# Patient Record
Sex: FEMALE | Race: WHITE | NOT HISPANIC OR LATINO | URBAN - METROPOLITAN AREA
[De-identification: names, ages, dates, MRNs, and addresses within clinical notes are randomized per-mention and may not be internally consistent; named-entity substitution may affect disease eponyms.]

---

## 2017-01-12 ENCOUNTER — APPOINTMENT (RX ONLY)
Dept: URBAN - METROPOLITAN AREA CLINIC 170 | Facility: CLINIC | Age: 37
Setting detail: DERMATOLOGY
End: 2017-01-12

## 2017-01-12 DIAGNOSIS — L81.1 CHLOASMA: ICD-10-CM | Status: IMPROVED

## 2017-01-12 DIAGNOSIS — L81.4 OTHER MELANIN HYPERPIGMENTATION: ICD-10-CM

## 2017-01-12 PROCEDURE — ? MEDICAL CONSULTATION: BROWN SPOTS

## 2017-01-12 PROCEDURE — ? COUNSELING

## 2017-01-12 PROCEDURE — 99212 OFFICE O/P EST SF 10 MIN: CPT

## 2017-01-12 ASSESSMENT — LOCATION DETAILED DESCRIPTION DERM
LOCATION DETAILED: LEFT MEDIAL MALAR CHEEK
LOCATION DETAILED: LEFT CENTRAL MALAR CHEEK

## 2017-01-12 ASSESSMENT — LOCATION ZONE DERM: LOCATION ZONE: FACE

## 2017-01-12 ASSESSMENT — LOCATION SIMPLE DESCRIPTION DERM: LOCATION SIMPLE: LEFT CHEEK

## 2017-01-18 ENCOUNTER — APPOINTMENT (RX ONLY)
Dept: URBAN - METROPOLITAN AREA CLINIC 170 | Facility: CLINIC | Age: 37
Setting detail: DERMATOLOGY
End: 2017-01-18

## 2017-01-18 DIAGNOSIS — Z41.9 ENCOUNTER FOR PROCEDURE FOR PURPOSES OTHER THAN REMEDYING HEALTH STATE, UNSPECIFIED: ICD-10-CM

## 2017-01-18 PROCEDURE — ? PALOMAR ICON LASER

## 2017-01-18 ASSESSMENT — LOCATION SIMPLE DESCRIPTION DERM: LOCATION SIMPLE: LEFT CHEEK

## 2017-01-18 ASSESSMENT — LOCATION DETAILED DESCRIPTION DERM: LOCATION DETAILED: LEFT INFERIOR CENTRAL MALAR CHEEK

## 2017-01-18 ASSESSMENT — LOCATION ZONE DERM: LOCATION ZONE: FACE

## 2017-01-18 NOTE — PROCEDURE: PALOMAR ICON LASER
Fluence Units: J/cm2
Fluence: 32
Treatment Number: 1
Price (Use Numbers Only, No Special Characters Or $): 500
Overlap: 10%
Detail Level: Zone
Pulse Duration (In Milliseconds): 20
Post-Care Instructions: I reviewed with the patient in detail post-care instructions. Patient should stay away from the sun and wear sun protection until treated areas are fully healed.
Pre-Procedure Text: The patients skin was cleaned and Lux lotion applied.
Pulse Duration (In Milliseconds): 15
External Cooling: Alexandro Cryo 6
Consent: Written consent obtained, risks reviewed including but not limited to crusting, scabbing, blistering, scarring, darker or lighter pigmentary change, bruising, and/or incomplete response.
Fluence: 38
Anesthesia Type: 1% lidocaine with epinephrine
External Cooling Fan Speed: 5
Pulse Duration (In Milliseconds): 20
Treated Area: medium area
Total Pulses: 168
Fluence: 32
Anesthesia Volume In Cc: 0

## 2017-05-10 ENCOUNTER — APPOINTMENT (RX ONLY)
Dept: URBAN - METROPOLITAN AREA CLINIC 167 | Facility: CLINIC | Age: 37
Setting detail: DERMATOLOGY
End: 2017-05-10

## 2017-05-10 DIAGNOSIS — L0391 CELLULITIS AND ABSCESS OF UNSPECIFIED SITES: ICD-10-CM | Status: RESOLVING

## 2017-05-10 DIAGNOSIS — D22 MELANOCYTIC NEVI: ICD-10-CM

## 2017-05-10 DIAGNOSIS — L0390 CELLULITIS AND ABSCESS OF UNSPECIFIED SITES: ICD-10-CM | Status: RESOLVING

## 2017-05-10 DIAGNOSIS — L81.4 OTHER MELANIN HYPERPIGMENTATION: ICD-10-CM

## 2017-05-10 PROBLEM — D23.72 OTHER BENIGN NEOPLASM OF SKIN OF LEFT LOWER LIMB, INCLUDING HIP: Status: ACTIVE | Noted: 2017-05-10

## 2017-05-10 PROBLEM — D48.5 NEOPLASM OF UNCERTAIN BEHAVIOR OF SKIN: Status: ACTIVE | Noted: 2017-05-10

## 2017-05-10 PROBLEM — D22.4 MELANOCYTIC NEVI OF SCALP AND NECK: Status: ACTIVE | Noted: 2017-05-10

## 2017-05-10 PROBLEM — D22.62 MELANOCYTIC NEVI OF LEFT UPPER LIMB, INCLUDING SHOULDER: Status: ACTIVE | Noted: 2017-05-10

## 2017-05-10 PROBLEM — L02.91 CUTANEOUS ABSCESS, UNSPECIFIED: Status: ACTIVE | Noted: 2017-05-10

## 2017-05-10 PROBLEM — D23.71 OTHER BENIGN NEOPLASM OF SKIN OF RIGHT LOWER LIMB, INCLUDING HIP: Status: ACTIVE | Noted: 2017-05-10

## 2017-05-10 PROBLEM — D22.61 MELANOCYTIC NEVI OF RIGHT UPPER LIMB, INCLUDING SHOULDER: Status: ACTIVE | Noted: 2017-05-10

## 2017-05-10 PROBLEM — D22.5 MELANOCYTIC NEVI OF TRUNK: Status: ACTIVE | Noted: 2017-05-10

## 2017-05-10 PROCEDURE — 11100: CPT

## 2017-05-10 PROCEDURE — ? BIOPSY BY SHAVE METHOD

## 2017-05-10 PROCEDURE — 99213 OFFICE O/P EST LOW 20 MIN: CPT | Mod: 25

## 2017-05-10 PROCEDURE — ? COUNSELING

## 2017-05-10 PROCEDURE — ? TREATMENT REGIMEN

## 2017-05-10 ASSESSMENT — LOCATION SIMPLE DESCRIPTION DERM
LOCATION SIMPLE: POSTERIOR SCALP
LOCATION SIMPLE: LEFT POSTERIOR UPPER ARM
LOCATION SIMPLE: LEFT UPPER BACK
LOCATION SIMPLE: RIGHT SHOULDER
LOCATION SIMPLE: LEFT BUTTOCK
LOCATION SIMPLE: RIGHT UPPER BACK
LOCATION SIMPLE: LEFT SHOULDER
LOCATION SIMPLE: RIGHT POSTERIOR UPPER ARM

## 2017-05-10 ASSESSMENT — LOCATION ZONE DERM
LOCATION ZONE: TRUNK
LOCATION ZONE: ARM
LOCATION ZONE: SCALP

## 2017-05-10 ASSESSMENT — LOCATION DETAILED DESCRIPTION DERM
LOCATION DETAILED: LEFT POSTAURICULAR SKIN
LOCATION DETAILED: RIGHT POSTAURICULAR SKIN
LOCATION DETAILED: RIGHT PROXIMAL LATERAL POSTERIOR UPPER ARM
LOCATION DETAILED: RIGHT POSTERIOR SHOULDER
LOCATION DETAILED: LEFT POSTERIOR SHOULDER
LOCATION DETAILED: RIGHT MEDIAL UPPER BACK
LOCATION DETAILED: LEFT PROXIMAL POSTERIOR UPPER ARM
LOCATION DETAILED: LEFT MEDIAL BUTTOCK
LOCATION DETAILED: RIGHT SUPERIOR UPPER BACK
LOCATION DETAILED: LEFT SUPERIOR UPPER BACK

## 2017-05-10 NOTE — PROCEDURE: TREATMENT REGIMEN
Detail Level: Simple
Initiate Treatment: Hibiclens every day to wash and cover\\nBleach water baths three times weekly

## 2017-05-10 NOTE — PROCEDURE: BIOPSY BY SHAVE METHOD
Silver Nitrate Text: The wound bed was treated with silver nitrate after the biopsy was performed.
Lab: [id number]
Lab Facility: [id number]
Biopsy Method: curette
Bill 19494 For Specimen Handling/Conveyance To Laboratory?: no
Consent: Written consent was obtained and risks were reviewed including but not limited to scarring, infection, bleeding, scabbing, incomplete removal, nerve damage and allergy to anesthesia.
Detail Level: Detailed
Type Of Destruction Used: Curettage
Cryotherapy Text: The wound bed was treated with cryotherapy after the biopsy was performed.
Size Of Lesion In Cm: 0.6
Wound Care: Vaseline
Anesthesia Volume In Cc (Will Not Render If 0): 0.5
Hemostasis: Pressure
Notification Instructions: Patient will be notified of biopsy results. However, patient instructed to call the office if not contacted within 2 weeks.
Additional Anesthesia Volume In Cc (Will Not Render If 0): 0
Post-Care Instructions: I reviewed with the patient in detail post-care instructions. Patient is to keep the biopsy site dry overnight, and then apply bacitracin twice daily until healed. Patient may apply hydrogen peroxide soaks to remove any crusting.
Biopsy Type: H and E
Dressing: bandage
Electrodesiccation And Curettage Text: The wound bed was treated with electrodesiccation and curettage after the biopsy was performed.
Billing Type: Third-Party Bill
Electrodesiccation Text: The wound bed was treated with electrodesiccation after the biopsy was performed.

## 2017-12-19 ENCOUNTER — APPOINTMENT (RX ONLY)
Dept: URBAN - METROPOLITAN AREA CLINIC 167 | Facility: CLINIC | Age: 37
Setting detail: DERMATOLOGY
End: 2017-12-19

## 2017-12-19 DIAGNOSIS — L30.8 OTHER SPECIFIED DERMATITIS: ICD-10-CM

## 2017-12-19 PROCEDURE — ? COUNSELING

## 2017-12-19 PROCEDURE — ? TREATMENT REGIMEN

## 2017-12-19 PROCEDURE — ? OTHER

## 2017-12-19 PROCEDURE — 99213 OFFICE O/P EST LOW 20 MIN: CPT

## 2017-12-19 PROCEDURE — ? PRESCRIPTION

## 2017-12-19 RX ORDER — TRIAMCINOLONE ACETONIDE 1 MG/G
CREAM TOPICAL
Qty: 1 | Refills: 1 | Status: ERX | COMMUNITY
Start: 2017-12-19

## 2017-12-19 RX ADMIN — TRIAMCINOLONE ACETONIDE: 1 CREAM TOPICAL at 00:00

## 2017-12-19 ASSESSMENT — LOCATION SIMPLE DESCRIPTION DERM
LOCATION SIMPLE: LEFT UPPER BACK
LOCATION SIMPLE: RIGHT UPPER ARM
LOCATION SIMPLE: ABDOMEN

## 2017-12-19 ASSESSMENT — LOCATION DETAILED DESCRIPTION DERM
LOCATION DETAILED: PERIUMBILICAL SKIN
LOCATION DETAILED: RIGHT ANTERIOR DISTAL UPPER ARM
LOCATION DETAILED: LEFT INFERIOR MEDIAL UPPER BACK

## 2017-12-19 ASSESSMENT — LOCATION ZONE DERM
LOCATION ZONE: ARM
LOCATION ZONE: TRUNK

## 2017-12-19 NOTE — PROCEDURE: OTHER
Detail Level: Detailed
Other (Free Text): Rash on neck, abd , arm for 2 days, not itching.\\nNo treatment\\nNo change in medicines\\nNo changes in outdoor activities or animal encounters\\nNo recent illnesses\\nUsually does not get a big reaction to mosquitoes\\nSon has been sick...had fifth disease\\n\\nThey are red round to oval non-scaly plaques with white flare\\nDon't exactly look like bug bites\\nCan't determine the cause at this time\\nWill start with TAC cr\\nIf needed, can increase to a stronger steroid if needed
Note Text (......Xxx Chief Complaint.): This diagnosis correlates with the

## 2017-12-19 NOTE — PROCEDURE: TREATMENT REGIMEN
Plan: Recognize and avoid triggers, outside environment, soaps, medications ,animals
Initiate Treatment: Triamcinolone 0.1% cream Apply to affected areas twice daily for up to two weeks as needed for the mild to moderate rash
Continue Regimen: Antihistamine
Detail Level: Zone

## 2019-08-29 ENCOUNTER — APPOINTMENT (RX ONLY)
Dept: URBAN - METROPOLITAN AREA CLINIC 166 | Facility: CLINIC | Age: 39
Setting detail: DERMATOLOGY
End: 2019-08-29

## 2019-08-29 DIAGNOSIS — D17 BENIGN LIPOMATOUS NEOPLASM: ICD-10-CM

## 2019-08-29 DIAGNOSIS — L50.3 DERMATOGRAPHIC URTICARIA: ICD-10-CM

## 2019-08-29 PROBLEM — D17.1 BENIGN LIPOMATOUS NEOPLASM OF SKIN AND SUBCUTANEOUS TISSUE OF TRUNK: Status: ACTIVE | Noted: 2019-08-29

## 2019-08-29 PROCEDURE — ? COUNSELING

## 2019-08-29 PROCEDURE — 99213 OFFICE O/P EST LOW 20 MIN: CPT

## 2019-08-29 ASSESSMENT — LOCATION ZONE DERM: LOCATION ZONE: TRUNK

## 2019-08-29 ASSESSMENT — LOCATION SIMPLE DESCRIPTION DERM: LOCATION SIMPLE: LEFT UPPER BACK

## 2019-08-29 ASSESSMENT — LOCATION DETAILED DESCRIPTION DERM: LOCATION DETAILED: LEFT INFERIOR UPPER BACK

## 2022-10-31 ENCOUNTER — APPOINTMENT (RX ONLY)
Dept: URBAN - METROPOLITAN AREA CLINIC 170 | Facility: CLINIC | Age: 42
Setting detail: DERMATOLOGY
End: 2022-10-31

## 2022-10-31 DIAGNOSIS — L82.0 INFLAMED SEBORRHEIC KERATOSIS: ICD-10-CM

## 2022-10-31 DIAGNOSIS — D18.0 HEMANGIOMA: ICD-10-CM

## 2022-10-31 DIAGNOSIS — L81.4 OTHER MELANIN HYPERPIGMENTATION: ICD-10-CM

## 2022-10-31 DIAGNOSIS — L82.1 OTHER SEBORRHEIC KERATOSIS: ICD-10-CM

## 2022-10-31 DIAGNOSIS — Z71.89 OTHER SPECIFIED COUNSELING: ICD-10-CM

## 2022-10-31 DIAGNOSIS — D17 BENIGN LIPOMATOUS NEOPLASM: ICD-10-CM

## 2022-10-31 DIAGNOSIS — D22 MELANOCYTIC NEVI: ICD-10-CM

## 2022-10-31 PROBLEM — D22.71 MELANOCYTIC NEVI OF RIGHT LOWER LIMB, INCLUDING HIP: Status: ACTIVE | Noted: 2022-10-31

## 2022-10-31 PROBLEM — D22.72 MELANOCYTIC NEVI OF LEFT LOWER LIMB, INCLUDING HIP: Status: ACTIVE | Noted: 2022-10-31

## 2022-10-31 PROBLEM — D22.62 MELANOCYTIC NEVI OF LEFT UPPER LIMB, INCLUDING SHOULDER: Status: ACTIVE | Noted: 2022-10-31

## 2022-10-31 PROBLEM — D22.5 MELANOCYTIC NEVI OF TRUNK: Status: ACTIVE | Noted: 2022-10-31

## 2022-10-31 PROBLEM — D18.01 HEMANGIOMA OF SKIN AND SUBCUTANEOUS TISSUE: Status: ACTIVE | Noted: 2022-10-31

## 2022-10-31 PROBLEM — D17.1 BENIGN LIPOMATOUS NEOPLASM OF SKIN AND SUBCUTANEOUS TISSUE OF TRUNK: Status: ACTIVE | Noted: 2022-10-31

## 2022-10-31 PROBLEM — D22.61 MELANOCYTIC NEVI OF RIGHT UPPER LIMB, INCLUDING SHOULDER: Status: ACTIVE | Noted: 2022-10-31

## 2022-10-31 PROCEDURE — ? COUNSELING

## 2022-10-31 PROCEDURE — 17110 DESTRUCTION B9 LES UP TO 14: CPT

## 2022-10-31 PROCEDURE — ? LIQUID NITROGEN

## 2022-10-31 PROCEDURE — 99202 OFFICE O/P NEW SF 15 MIN: CPT | Mod: 25

## 2022-10-31 ASSESSMENT — LOCATION DETAILED DESCRIPTION DERM
LOCATION DETAILED: RIGHT LATERAL ABDOMEN
LOCATION DETAILED: RIGHT DISTAL CALF
LOCATION DETAILED: LEFT INFERIOR UPPER BACK
LOCATION DETAILED: MID TRAPEZIAL NECK
LOCATION DETAILED: LEFT POSTERIOR SHOULDER
LOCATION DETAILED: LEFT DISTAL POSTERIOR UPPER ARM
LOCATION DETAILED: INFERIOR THORACIC SPINE
LOCATION DETAILED: LEFT SUPERIOR LATERAL UPPER BACK
LOCATION DETAILED: RIGHT DISTAL POSTERIOR UPPER ARM
LOCATION DETAILED: LEFT DISTAL CALF
LOCATION DETAILED: RIGHT POSTERIOR SHOULDER

## 2022-10-31 ASSESSMENT — LOCATION SIMPLE DESCRIPTION DERM
LOCATION SIMPLE: RIGHT SHOULDER
LOCATION SIMPLE: LEFT SHOULDER
LOCATION SIMPLE: LEFT CALF
LOCATION SIMPLE: LEFT POSTERIOR UPPER ARM
LOCATION SIMPLE: TRAPEZIAL NECK
LOCATION SIMPLE: RIGHT CALF
LOCATION SIMPLE: ABDOMEN
LOCATION SIMPLE: RIGHT POSTERIOR UPPER ARM
LOCATION SIMPLE: UPPER BACK
LOCATION SIMPLE: LEFT UPPER BACK

## 2022-10-31 ASSESSMENT — LOCATION ZONE DERM
LOCATION ZONE: LEG
LOCATION ZONE: ARM
LOCATION ZONE: NECK
LOCATION ZONE: TRUNK

## 2022-10-31 NOTE — PROCEDURE: LIQUID NITROGEN
Show Aperture Variable?: Yes
Detail Level: Simple
Post-Care Instructions: I reviewed with the patient in detail post-care instructions. Patient is to wear sunprotection, and avoid picking at any of the treated lesions. Pt may apply Vaseline to crusted or scabbing areas.
Medical Necessity Information: It is in your best interest to select a reason for this procedure from the list below. All of these items fulfill various CMS LCD requirements except the new and changing color options.
Consent: The patient's consent was obtained including but not limited to risks of crusting, scabbing, blistering, scarring, darker or lighter pigmentary change, recurrence, incomplete removal and infection.
Spray Paint Technique: No
Medical Necessity Clause: This procedure was medically necessary because the lesions that were treated were:irritated by picking
Number Of Freeze-Thaw Cycles: 1 freeze-thaw cycle
Spray Paint Text: The liquid nitrogen was applied to the skin utilizing a spray paint frosting technique.

## 2023-03-09 ENCOUNTER — APPOINTMENT (RX ONLY)
Dept: URBAN - METROPOLITAN AREA CLINIC 173 | Facility: CLINIC | Age: 43
Setting detail: DERMATOLOGY
End: 2023-03-09

## 2023-03-09 DIAGNOSIS — Z41.9 ENCOUNTER FOR PROCEDURE FOR PURPOSES OTHER THAN REMEDYING HEALTH STATE, UNSPECIFIED: ICD-10-CM

## 2023-03-09 PROCEDURE — ? COSMETIC CONSULTATION: BOTULINUM TOXIN

## 2023-03-09 PROCEDURE — ? DYSPORT

## 2023-03-09 PROCEDURE — ? COSMETIC CONSULTATION - MICRO-NEEDLING

## 2023-03-09 PROCEDURE — ? TREATMENT REGIMEN

## 2023-03-09 PROCEDURE — ? COSMETIC CONSULTATION: LASER RESURFACING

## 2023-03-09 PROCEDURE — ? PATIENT SPECIFIC COUNSELING

## 2023-03-09 PROCEDURE — ? COSMETIC CONSULTATION: CLEAR AND BRILLIANT

## 2023-03-09 NOTE — PROCEDURE: DYSPORT
Lot #: [id number]
Additional Area 2 Units: 0
Show Forehead Units: Yes
Dilution (U/ 0.1cc): 10
Show Right And Left Pupillary Line Units: No
Expiration Date (Month Year): 7/31/23
Detail Level: Zone
Price (Use Numbers Only, No Special Characters Or $): 486
Additional Area 1 Location: Face
Consent: Written consent obtained. Risks include but not limited to lid/brow ptosis, bruising, swelling, diplopia, temporary effect, incomplete chemical denervation.
Additional Area 1 Units: 132
Post-Care Instructions: Patient instructed to not lie down for 4 hours and limit physical activity for 24 hours.

## 2023-03-09 NOTE — PROCEDURE: TREATMENT REGIMEN
Plan: AM: Alto, AlphaRet, Ceramides, CeraVe PM, Elta spf\\nPM: CeraVe PM
Samples Given: Alto intensive, AlphaRet Overnight
Continue Regimen: Elta MD spf, Belinda carmonas
Detail Level: Zone

## 2023-03-09 NOTE — PROCEDURE: PATIENT SPECIFIC COUNSELING
Recommended 6 sessions WiQo $250 per tx. Patient wishes to start with this treatment
Detail Level: Zone

## 2023-03-23 ENCOUNTER — APPOINTMENT (RX ONLY)
Dept: URBAN - METROPOLITAN AREA CLINIC 173 | Facility: CLINIC | Age: 43
Setting detail: DERMATOLOGY
End: 2023-03-23

## 2023-03-23 DIAGNOSIS — Z41.9 ENCOUNTER FOR PROCEDURE FOR PURPOSES OTHER THAN REMEDYING HEALTH STATE, UNSPECIFIED: ICD-10-CM

## 2023-03-23 DIAGNOSIS — L91.8 OTHER HYPERTROPHIC DISORDERS OF THE SKIN: ICD-10-CM

## 2023-03-23 PROCEDURE — ? DIAGNOSIS COMMENT

## 2023-03-23 PROCEDURE — 11200 RMVL SKIN TAGS UP TO&INC 15: CPT

## 2023-03-23 PROCEDURE — ? WIQO PEEL

## 2023-03-23 PROCEDURE — ? SKIN TAG REMOVAL

## 2023-03-23 PROCEDURE — ? DYSPORT

## 2023-03-23 ASSESSMENT — LOCATION SIMPLE DESCRIPTION DERM: LOCATION SIMPLE: LEFT ANTERIOR NECK

## 2023-03-23 ASSESSMENT — LOCATION ZONE DERM: LOCATION ZONE: NECK

## 2023-03-23 ASSESSMENT — LOCATION DETAILED DESCRIPTION DERM: LOCATION DETAILED: LEFT SUPERIOR ANTERIOR NECK

## 2023-03-23 NOTE — PROCEDURE: DYSPORT
Dilution (U/ 0.1cc): 10
Periorbital Skin Units: 0
Show Forehead Units: Yes
Show Right And Left Pupillary Line Units: No
Expiration Date (Month Year): 7/31/23
Price (Use Numbers Only, No Special Characters Or $): 20
Consent: Written consent obtained. Risks include but not limited to lid/brow ptosis, bruising, swelling, diplopia, temporary effect, incomplete chemical denervation.
Additional Area 1 Location: Face
Post-Care Instructions: Patient instructed to not lie down for 4 hours and limit physical activity for 24 hours.
Additional Area 1 Units: 8
Detail Level: Zone
Lot #: [id number]

## 2023-03-23 NOTE — PROCEDURE: SKIN TAG REMOVAL
Include Z78.9 (Other Specified Conditions Influencing Health Status) As An Associated Diagnosis?: No
Add Associated Diagnoses If Applicable When Selecting Medical Necessity: Yes
Medical Necessity Clause: This procedure was medically necessary because the lesions that were treated were:
Anesthesia Volume In Cc: 0.2
Consent: Written consent obtained and the risks of skin tag removal was reviewed with the patient including but not limited to bleeding, pigmentary change, infection, pain, and remote possibility of scarring.
Detail Level: Simple
Medical Necessity Information: It is in your best interest to select a reason for this procedure from the list below. All of these items fulfill various CMS LCD requirements except the new and changing color options.

## 2023-03-23 NOTE — PROCEDURE: DIAGNOSIS COMMENT
Render Risk Assessment In Note?: no
Comment: Patient interested in adding neck to next treatment. \\nPatient plans to do series of 4 and re-evaluate.
Detail Level: Simple

## 2023-03-23 NOTE — PROCEDURE: WIQO PEEL
Consent: Prior to the procedure, written consent was obtained and risks were reviewed including but not limited to: redness, peeling, blistering, pigmentary change, scarring, infection, and pain.
Comments: Patient experienced mild erythema and sensation. Orbital rim was more sensitive.
Lot Number (Optional): [id number]
Prep: The treated site was cleansed with WiQo P solution.
Expiration Date (Optional): 01/2024
Post-Care Instructions: I reviewed with the patient in detail post-care instructions. Patient should apply WiQo facial smoothing fluid for all skin types starting the day after the PRX-T33 application.
Chemical Peel: WiQo PRX-T33
Detail Level: Zone
Post Peel Care: WiQo moisturizer applied post treatment. Post care reviewed with patient and handout provided. Patient was also provided with WiQo Glycolic Solution and WiQo Moisturizer samples.
Peel: The WiQo Peel PRX-T33 was applied. (3) passes face (1) pass perioral, (1) pass eyes
Price (Use Numbers Only, No Special Characters Or $): 354
Treatment Number: 0

## 2023-04-06 ENCOUNTER — APPOINTMENT (RX ONLY)
Dept: URBAN - METROPOLITAN AREA CLINIC 173 | Facility: CLINIC | Age: 43
Setting detail: DERMATOLOGY
End: 2023-04-06

## 2023-04-06 DIAGNOSIS — Z41.9 ENCOUNTER FOR PROCEDURE FOR PURPOSES OTHER THAN REMEDYING HEALTH STATE, UNSPECIFIED: ICD-10-CM

## 2023-04-06 PROCEDURE — ? DIAGNOSIS COMMENT

## 2023-04-06 PROCEDURE — ? WIQO PEEL

## 2023-04-06 NOTE — PROCEDURE: DIAGNOSIS COMMENT
Render Risk Assessment In Note?: no
Comment: Discussed doing one more treatment and re-evaluating. Briefly discussed microneedling as an alternative option.
Detail Level: Simple

## 2023-04-06 NOTE — PROCEDURE: WIQO PEEL
Consent: Prior to the procedure, written consent was obtained and risks were reviewed including but not limited to: redness, peeling, blistering, pigmentary change, scarring, infection, and pain.
Comments: Patient experienced mild erythema. Orbital rim was more sensitive and stung more than last treatment.
Lot Number (Optional): [id number]
Prep: The treated site was cleansed with WiQo P solution.
Expiration Date (Optional): 01/2024
Post-Care Instructions: I reviewed with the patient in detail post-care instructions. Patient should apply WiQo facial smoothing fluid for all skin types starting the day after the PRX-T33 application.
Chemical Peel: WiQo PRX-T33
Detail Level: Zone
Post Peel Care: WiQo moisturizer applied post treatment. Post care reviewed with patient and handout provided. Patient was also provided with WiQo Glycolic Solution and WiQo Moisturizer samples.
Peel: The WiQo Peel PRX-T33 was applied. (3) passes face and neck (1) pass perioral, (1) pass eyes
Price (Use Numbers Only, No Special Characters Or $): 936
Treatment Number: 2

## 2023-04-18 ENCOUNTER — APPOINTMENT (RX ONLY)
Dept: URBAN - METROPOLITAN AREA CLINIC 173 | Facility: CLINIC | Age: 43
Setting detail: DERMATOLOGY
End: 2023-04-18

## 2023-04-18 DIAGNOSIS — Z41.9 ENCOUNTER FOR PROCEDURE FOR PURPOSES OTHER THAN REMEDYING HEALTH STATE, UNSPECIFIED: ICD-10-CM

## 2023-04-18 PROCEDURE — ? WIQO PEEL

## 2023-04-18 NOTE — PROCEDURE: WIQO PEEL
Consent: Prior to the procedure, written consent was obtained and risks were reviewed including but not limited to: redness, peeling, blistering, pigmentary change, scarring, infection, and pain.
Comments: Patients skin appeared a little blotch post treatment.
Lot Number (Optional): [id number]
Prep: The treated site was cleansed with WiQo P solution.
Expiration Date (Optional): 01/2024
Post-Care Instructions: I reviewed with the patient in detail post-care instructions. Patient should apply WiQo facial smoothing fluid for all skin types starting the day after the PRX-T33 application.
Chemical Peel: WiQo PRX-T33
Detail Level: Zone
Post Peel Care: WiQo moisturizer applied post treatment. Post care reviewed with patient and handout provided. Patient was also provided with WiQo Glycolic Solution and WiQo Moisturizer samples.
Peel: The WiQo Peel PRX-T33 was applied. (3) passes face (1) pass perioral, (1) pass eyes
Price (Use Numbers Only, No Special Characters Or $): 799
Treatment Number: 3

## 2023-04-28 ENCOUNTER — APPOINTMENT (RX ONLY)
Dept: URBAN - METROPOLITAN AREA CLINIC 173 | Facility: CLINIC | Age: 43
Setting detail: DERMATOLOGY
End: 2023-04-28

## 2023-04-28 DIAGNOSIS — Z41.9 ENCOUNTER FOR PROCEDURE FOR PURPOSES OTHER THAN REMEDYING HEALTH STATE, UNSPECIFIED: ICD-10-CM

## 2023-04-28 PROCEDURE — ? WIQO PEEL

## 2023-04-28 PROCEDURE — ? COSMETIC CONSULTATION: LASER RESURFACING

## 2023-04-28 NOTE — PROCEDURE: WIQO PEEL
Consent: Prior to the procedure, written consent was obtained and risks were reviewed including but not limited to: redness, peeling, blistering, pigmentary change, scarring, infection, and pain.
Lot Number (Optional): [id number]
Prep: The treated site was cleansed with WiQo P solution.
Expiration Date (Optional): 01/2024
Post-Care Instructions: I reviewed with the patient in detail post-care instructions. Patient should apply WiQo facial smoothing fluid for all skin types starting the day after the PRX-T33 application.
Chemical Peel: WiQo PRX-T33
Detail Level: Zone
Post Peel Care: WiQo moisturizer applied post treatment. Post care reviewed with patient and handout provided. Patient was also provided with WiQo Glycolic Solution and WiQo Moisturizer samples.
Peel: The WiQo Peel PRX-T33 was applied. (3) passes forehead, cheeks, (2) pass perioral, (1) pass eyes
Price (Use Numbers Only, No Special Characters Or $): 124
Treatment Number: 4

## 2023-05-25 ENCOUNTER — APPOINTMENT (RX ONLY)
Dept: URBAN - METROPOLITAN AREA CLINIC 173 | Facility: CLINIC | Age: 43
Setting detail: DERMATOLOGY
End: 2023-05-25

## 2023-05-25 DIAGNOSIS — L57.8 OTHER SKIN CHANGES DUE TO CHRONIC EXPOSURE TO NONIONIZING RADIATION: ICD-10-CM

## 2023-05-25 DIAGNOSIS — Z41.9 ENCOUNTER FOR PROCEDURE FOR PURPOSES OTHER THAN REMEDYING HEALTH STATE, UNSPECIFIED: ICD-10-CM

## 2023-05-25 PROCEDURE — ? PRESCRIPTION

## 2023-05-25 PROCEDURE — ? DYSPORT

## 2023-05-25 RX ORDER — PHARMACY COMPOUNDING ACCESSORY
EACH MISCELLANEOUS
Qty: 30 | Refills: 3 | Status: ERX | COMMUNITY
Start: 2023-05-25

## 2023-05-25 RX ADMIN — Medication: at 00:00

## 2023-05-25 NOTE — PROCEDURE: DYSPORT
Dilution (U/ 0.1cc): 10
Show Orbicularis Oculi Units: Yes
Right Periorbital Units: 0
Show Mentalis Units: No
Expiration Date (Month Year): 11/30/23
Price (Use Numbers Only, No Special Characters Or $): 584
Consent: Written consent obtained. Risks include but not limited to lid/brow ptosis, bruising, swelling, diplopia, temporary effect, incomplete chemical denervation.
Additional Area 1 Location: face
Post-Care Instructions: Patient instructed to not lie down for 4 hours and limit physical activity for 24 hours. Patient instructed not to travel by airplane for 48 hours.
Additional Area 1 Units: 119
Additional Area 2 Location: neck
Detail Level: Zone
Lot #: [id number]
Additional Area 3 Location: Perioral

## 2023-07-21 ENCOUNTER — APPOINTMENT (RX ONLY)
Dept: URBAN - METROPOLITAN AREA CLINIC 173 | Facility: CLINIC | Age: 43
Setting detail: DERMATOLOGY
End: 2023-07-21

## 2023-07-21 DIAGNOSIS — L91.8 OTHER HYPERTROPHIC DISORDERS OF THE SKIN: ICD-10-CM

## 2023-07-21 DIAGNOSIS — Z41.9 ENCOUNTER FOR PROCEDURE FOR PURPOSES OTHER THAN REMEDYING HEALTH STATE, UNSPECIFIED: ICD-10-CM

## 2023-07-21 PROCEDURE — ? SKIN TAG REMOVAL (COSMETIC)

## 2023-07-21 PROCEDURE — ? DAXXIFY

## 2023-07-21 PROCEDURE — ? BENIGN DESTRUCTION COSMETIC

## 2023-07-21 ASSESSMENT — LOCATION DETAILED DESCRIPTION DERM
LOCATION DETAILED: LEFT SUPERIOR CENTRAL MALAR CHEEK
LOCATION DETAILED: LEFT LATERAL CANTHUS

## 2023-07-21 ASSESSMENT — LOCATION ZONE DERM
LOCATION ZONE: FACE
LOCATION ZONE: EYELID

## 2023-07-21 ASSESSMENT — LOCATION SIMPLE DESCRIPTION DERM
LOCATION SIMPLE: LEFT CHEEK
LOCATION SIMPLE: LEFT EYELID

## 2023-07-21 NOTE — PROCEDURE: BENIGN DESTRUCTION COSMETIC
Consent: The patient's consent was obtained including but not limited to risks of crusting, scabbing, blistering, scarring, darker or lighter pigmentary change, recurrence, incomplete removal and infection.
Post-Care Instructions: I reviewed with the patient in detail post-care instructions. Patient is to wear sunprotection, and avoid picking at any of the treated lesions. Pt may apply Vaseline to crusted or scabbing areas.
Detail Level: Zone
Price (Use Numbers Only, No Special Characters Or $): 0

## 2023-07-21 NOTE — PROCEDURE: SKIN TAG REMOVAL (COSMETIC)
Detail Level: Zone
Removed With: scissors
Price (Use Numbers Only, No Special Characters Or $): 0
Consent: Written consent obtained and the risks of skin tag removal was reviewed with the patient including but not limited to bleeding, pigmentary change, infection, pain, and remote possibility of scarring.
Hemostasis: Electrodesiccation

## 2023-07-21 NOTE — PROCEDURE: DAXXIFY
Lcl Root Units: 0
Show Levator Superior Units: Yes
Price (Use Numbers Only, No Special Characters Or $): 939
Additional Area 1 Location: face
Consent: Written consent obtained. Risks include but not limited to lid/brow ptosis, bruising, swelling, diplopia, temporary effect, incomplete chemical denervation.
Additional Area 1 Units: 84
Show Ucl Units: No
Post-Care Instructions: Patient instructed to not lie down for 4 hours and limit physical activity for 24 hours.
Lot #: [id number]
Detail Level: Detailed
Dilution (U/0.1 Cc): 4
Expiration Date (Month Year): 02/24

## 2023-09-25 ENCOUNTER — APPOINTMENT (RX ONLY)
Dept: URBAN - METROPOLITAN AREA CLINIC 167 | Facility: CLINIC | Age: 43
Setting detail: DERMATOLOGY
End: 2023-09-25

## 2023-09-25 DIAGNOSIS — L82.0 INFLAMED SEBORRHEIC KERATOSIS: ICD-10-CM

## 2023-09-25 PROCEDURE — ? LIQUID NITROGEN

## 2023-09-25 PROCEDURE — 17110 DESTRUCTION B9 LES UP TO 14: CPT

## 2023-09-25 PROCEDURE — ? COUNSELING

## 2023-09-25 ASSESSMENT — LOCATION SIMPLE DESCRIPTION DERM
LOCATION SIMPLE: POSTERIOR NECK
LOCATION SIMPLE: LEFT UPPER BACK
LOCATION SIMPLE: LEFT LOWER BACK

## 2023-09-25 ASSESSMENT — LOCATION DETAILED DESCRIPTION DERM
LOCATION DETAILED: LEFT SUPERIOR LATERAL MIDBACK
LOCATION DETAILED: LEFT INFERIOR LATERAL UPPER BACK
LOCATION DETAILED: RIGHT LATERAL TRAPEZIAL NECK
LOCATION DETAILED: LEFT INFERIOR UPPER BACK

## 2023-09-25 ASSESSMENT — LOCATION ZONE DERM
LOCATION ZONE: NECK
LOCATION ZONE: TRUNK

## 2023-09-25 NOTE — PROCEDURE: LIQUID NITROGEN
Consent: The patient's consent was obtained including but not limited to risks of crusting, scabbing, blistering, scarring, darker or lighter pigmentary change, recurrence, incomplete removal and infection.
Spray Paint Text: The liquid nitrogen was applied to the skin utilizing a spray paint frosting technique.
Add 52 Modifier (Optional): no
Show Aperture Variable?: Yes
Medical Necessity Information: It is in your best interest to select a reason for this procedure from the list below. All of these items fulfill various CMS LCD requirements except the new and changing color options.
Medical Necessity Clause: This procedure was medically necessary because the lesions that were treated were:
Detail Level: Simple
Post-Care Instructions: I reviewed with the patient in detail post-care instructions. Patient is to wear sunprotection, and avoid picking at any of the treated lesions. Pt may apply Vaseline to crusted or scabbing areas.

## 2023-10-03 ENCOUNTER — APPOINTMENT (RX ONLY)
Dept: URBAN - METROPOLITAN AREA CLINIC 173 | Facility: CLINIC | Age: 43
Setting detail: DERMATOLOGY
End: 2023-10-03

## 2023-10-03 DIAGNOSIS — Z41.9 ENCOUNTER FOR PROCEDURE FOR PURPOSES OTHER THAN REMEDYING HEALTH STATE, UNSPECIFIED: ICD-10-CM

## 2023-10-03 PROCEDURE — ? DYSPORT

## 2023-10-03 ASSESSMENT — LOCATION ZONE DERM: LOCATION ZONE: LIP

## 2023-10-03 ASSESSMENT — LOCATION SIMPLE DESCRIPTION DERM: LOCATION SIMPLE: LEFT LIP

## 2023-10-03 ASSESSMENT — LOCATION DETAILED DESCRIPTION DERM: LOCATION DETAILED: LEFT INFERIOR VERMILION LIP

## 2023-10-03 NOTE — PROCEDURE: DYSPORT
Dilution (U/ 0.1cc): 10
Show Orbicularis Oculi Units: Yes
Left Pupillary Line Units: 0
Show Right And Left Pupillary Line Units: No
Expiration Date (Month Year): 4/30/25
Price (Use Numbers Only, No Special Characters Or $): 435
Consent: Written consent obtained. Risks include but not limited to lid/brow ptosis, bruising, swelling, diplopia, temporary effect, incomplete chemical denervation.
Additional Area 1 Location: Face
Additional Area 1 Units: 160
Post-Care Instructions: Patient instructed to not lie down for 4 hours and limit physical activity for 24 hours.
Detail Level: Zone
Lot #: [id number]

## 2024-01-02 ENCOUNTER — RX ONLY (OUTPATIENT)
Age: 44
Setting detail: RX ONLY
End: 2024-01-02

## 2024-01-02 ENCOUNTER — APPOINTMENT (RX ONLY)
Dept: URBAN - METROPOLITAN AREA CLINIC 173 | Facility: CLINIC | Age: 44
Setting detail: DERMATOLOGY
End: 2024-01-02

## 2024-01-02 DIAGNOSIS — Z41.9 ENCOUNTER FOR PROCEDURE FOR PURPOSES OTHER THAN REMEDYING HEALTH STATE, UNSPECIFIED: ICD-10-CM

## 2024-01-02 PROCEDURE — ? PATIENT SPECIFIC COUNSELING

## 2024-01-02 PROCEDURE — ? DYSPORT

## 2024-01-02 PROCEDURE — ? COSMETIC CONSULTATION: FILLERS

## 2024-01-02 RX ORDER — LIDOCAINE AND PRILOCAINE 25; 25 MG/G; MG/G
CREAM TOPICAL
Qty: 30 | Refills: 3 | Status: ERX | COMMUNITY
Start: 2024-01-02

## 2024-01-02 RX ORDER — PHARMACY COMPOUNDING ACCESSORY
EACH MISCELLANEOUS
Qty: 30 | Refills: 4 | Status: ERX

## 2024-01-02 NOTE — PROCEDURE: DYSPORT
Additional Area 5 Units: 0
Additional Area 1 Location: Face
Show Masseter Units: Yes
Show Ucl Units: No
Consent: Written consent obtained. Risks include but not limited to lid/brow ptosis, bruising, swelling, diplopia, temporary effect, incomplete chemical denervation.
Additional Area 1 Units: 160
Post-Care Instructions: Patient instructed to not lie down for 4 hours and limit physical activity for 24 hours.
Dilution (U/0.1 Cc): 10
Lot #: [id number]
Detail Level: Zone
Expiration Date (Month Year): 7/31/25
Price (Use Numbers Only, No Special Characters Or $): 241

## 2024-01-26 ENCOUNTER — APPOINTMENT (RX ONLY)
Dept: URBAN - METROPOLITAN AREA CLINIC 173 | Facility: CLINIC | Age: 44
Setting detail: DERMATOLOGY
End: 2024-01-26

## 2024-01-26 DIAGNOSIS — Z41.9 ENCOUNTER FOR PROCEDURE FOR PURPOSES OTHER THAN REMEDYING HEALTH STATE, UNSPECIFIED: ICD-10-CM

## 2024-01-26 PROCEDURE — ? FILLERS

## 2024-01-26 NOTE — PROCEDURE: FILLERS
Lateral Face Filler Volume In Cc: 0
Lot #: [id number]** mpc special**
Include Cannula Information In Note?: Yes
Anesthesia Volume In Cc: 0.2
Expiration Date (Month Year): 2/28/24
Include Cannula Information In Note?: No
Lot #: [id number]
Include Cannula Size?: 25G
Detail Level: Zone
Topical Anesthesia?: 2.5% lidocaine, 2.5% prilocaine
Include Cannula Length?: 1.5 inch
Additional Area 1 Location: Face
Expiration Date (Month Year): 5/15/16
Additional Area 1 Volume In Cc: 1
Price (Use Numbers Only, No Special Characters Or $): 328
Filler: Kev Bernabe
Map Statment: See Attach Map for Complete Details
Aspiration Statement: Aspiration was performed prior to injecting site with filler.
Consent: Written consent obtained. Risks include but not limited to bruising, beading, irregular texture, ulceration, infection, allergic reaction, scar formation, incomplete augmentation, temporary nature, procedural pain.
Lot #: [id number]** Mpc special**
Post-Care Instructions: Patient instructed to apply ice to reduce swelling.
Expiration Date (Month Year): 5/31/25
Anesthesia Type: 2% lidocaine with epinephrine and a 1:10 solution of 8.4% sodium bicarbonate

## 2024-02-15 ENCOUNTER — APPOINTMENT (RX ONLY)
Dept: URBAN - METROPOLITAN AREA CLINIC 173 | Facility: CLINIC | Age: 44
Setting detail: DERMATOLOGY
End: 2024-02-15

## 2024-02-15 DIAGNOSIS — Z41.9 ENCOUNTER FOR PROCEDURE FOR PURPOSES OTHER THAN REMEDYING HEALTH STATE, UNSPECIFIED: ICD-10-CM

## 2024-02-15 PROCEDURE — ? FILLERS

## 2024-02-15 NOTE — PROCEDURE: FILLERS
Brows Filler Volume In Cc: 0
Additional Area 1 Location: Face
Include Cannula Information In Note?: Yes
Include Cannula Length?: 1.5 inch
Topical Anesthesia?: 2.5% lidocaine, 2.5% prilocaine
Detail Level: Zone
Include Cannula Size?: 25G
Additional Area 1 Volume In Cc: 1
Include Cannula Information In Note?: No
Price (Use Numbers Only, No Special Characters Or $): 6457
Additional Area 1 Volume In Cc: 0.5
Filler: Kev Bernabe
Consent: Written consent obtained. Risks include but not limited to bruising, beading, irregular texture, ulceration, infection, allergic reaction, scar formation, incomplete augmentation, temporary nature, procedural pain.
Post-Care Instructions: Patient instructed to apply ice to reduce swelling.
Map Statment: See Attach Map for Complete Details
Filler: Restylane Eyelight
Aspiration Statement: Aspiration was performed prior to injecting site with filler.
Lot #: [id number]
Lot #: [id number]** Mpc special**
Expiration Date (Month Year): 5/15/16
Lot #: [id number]** mpc special**
Expiration Date (Month Year): 2/28/25
Expiration Date (Month Year): 3/31/26
Anesthesia Volume In Cc: 0.2

## 2024-03-28 ENCOUNTER — APPOINTMENT (RX ONLY)
Dept: URBAN - METROPOLITAN AREA CLINIC 173 | Facility: CLINIC | Age: 44
Setting detail: DERMATOLOGY
End: 2024-03-28

## 2024-03-28 DIAGNOSIS — Z41.9 ENCOUNTER FOR PROCEDURE FOR PURPOSES OTHER THAN REMEDYING HEALTH STATE, UNSPECIFIED: ICD-10-CM

## 2024-03-28 PROCEDURE — ? COSMETIC CONSULTATION: LASER RESURFACING

## 2024-03-28 PROCEDURE — ? DYSPORT

## 2024-03-28 ASSESSMENT — LOCATION DETAILED DESCRIPTION DERM: LOCATION DETAILED: LEFT INFERIOR CENTRAL MALAR CHEEK

## 2024-03-28 ASSESSMENT — LOCATION ZONE DERM: LOCATION ZONE: FACE

## 2024-03-28 ASSESSMENT — LOCATION SIMPLE DESCRIPTION DERM: LOCATION SIMPLE: LEFT CHEEK

## 2024-03-28 NOTE — PROCEDURE: DYSPORT
Show Right And Left Pupillary Line Units: No
Dilution (U/0.1 Cc): 10
Show Forehead Units: Yes
Masseter Units: 0
Expiration Date (Month Year): 8/31/25
Price (Use Numbers Only, No Special Characters Or $): 533
Consent: Written consent obtained. Risks include but not limited to lid/brow ptosis, bruising, swelling, diplopia, temporary effect, incomplete chemical denervation.
Post-Care Instructions: Patient instructed to not lie down for 4 hours and limit physical activity for 24 hours.
Additional Area 1 Units: 152
Additional Area 1 Location: Face
Detail Level: Zone
Lot #: [id number]

## 2024-06-14 ENCOUNTER — APPOINTMENT (RX ONLY)
Dept: URBAN - METROPOLITAN AREA CLINIC 173 | Facility: CLINIC | Age: 44
Setting detail: DERMATOLOGY
End: 2024-06-14

## 2024-06-14 DIAGNOSIS — Z41.9 ENCOUNTER FOR PROCEDURE FOR PURPOSES OTHER THAN REMEDYING HEALTH STATE, UNSPECIFIED: ICD-10-CM

## 2024-06-14 PROCEDURE — ? DYSPORT

## 2024-06-14 NOTE — PROCEDURE: DYSPORT
Show Right And Left Pupillary Line Units: No
Dilution (U/0.1 Cc): 10
Show Forehead Units: Yes
Masseter Units: 0
Expiration Date (Month Year): 11/30/25
Price (Use Numbers Only, No Special Characters Or $): 425
Consent: Written consent obtained. Risks include but not limited to lid/brow ptosis, bruising, swelling, diplopia, temporary effect, incomplete chemical denervation.
Post-Care Instructions: Patient instructed to not lie down for 4 hours and limit physical activity for 24 hours.
Additional Area 1 Units: 152
Additional Area 1 Location: Face
Detail Level: Zone
Lot #: [id number]

## 2024-06-27 ENCOUNTER — APPOINTMENT (RX ONLY)
Dept: URBAN - METROPOLITAN AREA CLINIC 170 | Facility: CLINIC | Age: 44
Setting detail: DERMATOLOGY
End: 2024-06-27

## 2024-06-27 DIAGNOSIS — L0390 CELLULITIS AND ABSCESS OF UNSPECIFIED SITES: ICD-10-CM

## 2024-06-27 DIAGNOSIS — L91.8 OTHER HYPERTROPHIC DISORDERS OF THE SKIN: ICD-10-CM

## 2024-06-27 DIAGNOSIS — L0391 CELLULITIS AND ABSCESS OF UNSPECIFIED SITES: ICD-10-CM

## 2024-06-27 PROBLEM — L02.31 CUTANEOUS ABSCESS OF BUTTOCK: Status: ACTIVE | Noted: 2024-06-27

## 2024-06-27 PROCEDURE — 99212 OFFICE O/P EST SF 10 MIN: CPT | Mod: 25

## 2024-06-27 PROCEDURE — ? INCISION AND DRAINAGE

## 2024-06-27 PROCEDURE — ? ORDER TESTS

## 2024-06-27 PROCEDURE — 10060 I&D ABSCESS SIMPLE/SINGLE: CPT

## 2024-06-27 PROCEDURE — ? PRESCRIPTION

## 2024-06-27 PROCEDURE — ? TREATMENT REGIMEN

## 2024-06-27 PROCEDURE — ? COUNSELING

## 2024-06-27 RX ORDER — CEPHALEXIN 500 MG/1
TABLET ORAL QID
Qty: 30 | Refills: 0 | Status: ERX | COMMUNITY
Start: 2024-06-27

## 2024-06-27 RX ADMIN — CEPHALEXIN: 500 TABLET ORAL at 00:00

## 2024-06-27 ASSESSMENT — LOCATION ZONE DERM: LOCATION ZONE: TRUNK

## 2024-06-27 ASSESSMENT — LOCATION DETAILED DESCRIPTION DERM: LOCATION DETAILED: LEFT BUTTOCK

## 2024-06-27 ASSESSMENT — LOCATION SIMPLE DESCRIPTION DERM: LOCATION SIMPLE: LEFT BUTTOCK

## 2024-06-27 NOTE — PROCEDURE: INCISION AND DRAINAGE
Detail Level: Simple
Lesion Type: Abscess
Method: 11 blade
Curette: No
Anesthesia Type: 0.5% lidocaine without epinephrine
Anesthesia Volume In Cc: 0.7
Size Of Lesion In Cm (Optional But May Be Required For Some Insurances): 0
Drainage Amount?: moderate
Drainage Type?: bloody and cyst-like
Wound Care: Vaseline
Dressing: dry sterile dressing
Epidermal Sutures: 4-0 Prolene
Epidermal Closure: simple interrupted
Suture Text: The drainage sites were closed and the fluctuant area was tacked down.  Suture removal in 2 weeks.
Preparation Text: The area was prepped in the usual clean fashion.
Consent was obtained and risks were reviewed including but not limited to delayed wound healing, infection, need for multiple I and D's, and pain.
Post-Care Instructions: I reviewed with the patient in detail post-care instructions. Patient should keep wound covered and call the office should any redness, pain, swelling or worsening occur.

## 2024-06-27 NOTE — HPI: SUBCUTANEOUS GROWTH
Has Your Growth Been Treated?: not been treated
Is This A New Presentation, Or A Follow-Up?: Subcutaneous Growth
Additional History: Patient states abscess was caused by insulin injection.

## 2024-06-27 NOTE — PROCEDURE: ORDER TESTS
Performing Laboratory: [id number]
Bill For Surgical Tray: no
Billing Type: Third-Party Bill
Expected Date Of Service: 06/27/2024

## 2024-08-15 ENCOUNTER — APPOINTMENT (RX ONLY)
Dept: URBAN - METROPOLITAN AREA CLINIC 173 | Facility: CLINIC | Age: 44
Setting detail: DERMATOLOGY
End: 2024-08-15

## 2024-08-15 DIAGNOSIS — Z41.9 ENCOUNTER FOR PROCEDURE FOR PURPOSES OTHER THAN REMEDYING HEALTH STATE, UNSPECIFIED: ICD-10-CM

## 2024-08-15 DIAGNOSIS — B00.1 HERPESVIRAL VESICULAR DERMATITIS: ICD-10-CM

## 2024-08-15 DIAGNOSIS — L81.1 CHLOASMA: ICD-10-CM

## 2024-08-15 PROCEDURE — ? FRAXEL

## 2024-08-15 PROCEDURE — ? PRESCRIPTION

## 2024-08-15 PROCEDURE — ? TREATMENT REGIMEN

## 2024-08-15 PROCEDURE — ? DIAGNOSIS COMMENT

## 2024-08-15 RX ORDER — PHARMACY COMPOUNDING ACCESSORY
G EACH MISCELLANEOUS NIGHTLY
Qty: 30 | Refills: 1 | Status: ERX

## 2024-08-15 RX ORDER — VALACYCLOVIR HYDROCHLORIDE 1 G/1
TABLET, FILM COATED ORAL Q12 HOURS
Qty: 24 | Refills: 3 | Status: ERX | COMMUNITY
Start: 2024-08-15

## 2024-08-15 RX ADMIN — VALACYCLOVIR HYDROCHLORIDE: 1 TABLET, FILM COATED ORAL at 00:00

## 2024-08-15 NOTE — PROCEDURE: TREATMENT REGIMEN
Detail Level: Zone
Initiate Treatment: 1 week after Fraxel: Start hydroquinone 8%, tretinoin 0.025%, kojic acid 1%, niacinamide 4%, fluocinolone 0.025% cream in W06 base.  Apply a thin layer nightly for 4-5 months.

## 2024-08-15 NOTE — PROCEDURE: FRAXEL
Number Of Passes: 1
Tip: 15mm
Medium Metal Eye Shield Text: The ocular mucosa was anesthetized with tetracaine. Once adequate anesthesia was optained, medium metal eye shields were inserted and remained in place until the procedure was completed.
Location Override: perioral area, under eyes
Energy(Mj/Cm2): 10
Energy(Mj/Cm2): 60
Large Metal Eye Shield Text: The ocular mucosa was anesthetized with tetracaine. Once adequate anesthesia was optained, large metal eye shields were inserted and remained in place until the procedure was completed.
Treatment Level: 6
Topical Anesthesia Type: 30% lidocaine, plasticized base
Number Of Passes: 4
Number Of Passes: 8
Small Plastic Eye Shield Text: The ocular mucosa was anesthetized with tetracaine. Once adequate anesthesia was optained, small plastic eye shields were inserted and remained in place until the procedure was completed.
External Cooling Fan Speed: 5
Consent: Written consent obtained, risks reviewed including but not limited to pain and incomplete improvement.
Was An Eye Shield Used?: No
Price (Use Numbers Only, No Special Characters Or $): 958
Medium Plastic Eye Shield Text: The ocular mucosa was anesthetized with tetracaine. Once adequate anesthesia was optained, medium plastic eye shields were inserted and remained in place until the procedure was completed.
Detail Level: Zone
Post-Care Instructions: I reviewed with the patient in detail post-care instructions. Patient should avoid sun until area fully healed.
Total Energy In Kj (Optional- Don't Include Units): 0.95
Location: perioral area
Small Metal Eye Shield Text: The ocular mucosa was anesthetized with tetracaine. Once adequate anesthesia was optained, small metal eye shields were inserted and remained in place until the procedure was completed.
Total Energy In Kj (Optional- Don't Include Units): 0.31
Wavelength: 1927nm
Wavelength: 1550nm
Indication: photodamage
Large Plastic Eye Shield Text: The ocular mucosa was anesthetized with tetracaine. Once adequate anesthesia was optained, large plastic eye shields were inserted and remained in place until the procedure was completed.

## 2024-09-19 ENCOUNTER — APPOINTMENT (RX ONLY)
Dept: URBAN - METROPOLITAN AREA CLINIC 173 | Facility: CLINIC | Age: 44
Setting detail: DERMATOLOGY
End: 2024-09-19

## 2024-09-19 DIAGNOSIS — Z41.9 ENCOUNTER FOR PROCEDURE FOR PURPOSES OTHER THAN REMEDYING HEALTH STATE, UNSPECIFIED: ICD-10-CM

## 2024-09-19 PROCEDURE — ? DYSPORT

## 2024-09-19 PROCEDURE — ? FILLERS

## 2024-09-19 PROCEDURE — ? OTHER (COSMETIC)

## 2024-09-19 NOTE — PROCEDURE: DYSPORT
Show Right And Left Pupillary Line Units: No
Dilution (U/0.1 Cc): 10
Show Forehead Units: Yes
Masseter Units: 0
Expiration Date (Month Year): 03/31/2026
Price (Use Numbers Only, No Special Characters Or $): 624
Consent: Written consent obtained. Risks include but not limited to lid/brow ptosis, bruising, swelling, diplopia, temporary effect, incomplete chemical denervation.
Post-Care Instructions: Patient instructed to not lie down for 4 hours and limit physical activity for 24 hours.
Additional Area 1 Units: 152
Additional Area 1 Location: Face
Detail Level: Zone
Lot #: [id number]

## 2024-09-19 NOTE — PROCEDURE: OTHER (COSMETIC)
Detail Level: Zone
Other (Free Text): Plan for a series of 4-6 perioral Fraxel treatments with CLT, $350/treatment.
Price (Use Numbers Only, No Special Characters Or $): 0

## 2024-09-19 NOTE — PROCEDURE: FILLERS
Additional Area 2 Volume In Cc: 0
Lot #: [id number] *JKENNETH Special*
Include Cannula Length?: 1.5 inch
Expiration Date (Month Year): 01/31/2026
Include Cannula Information In Note?: No
Anesthesia Volume In Cc: 0.5
Filler Comments: Filler blended with 0.2cc of lidocaine without epi
Additional Anesthesia Volume In Cc: 6
Topical Anesthesia?: 30% lidocaine, plasticized base
Consent: Risks reviewed including but not limited to bruising, irregular texture, incomplete augmentation and procedural pain. Written consent obtained.
Detail Level: Detailed
Lot #: [id number]
Additional Area 1 Location: face
Price (Use Numbers Only, No Special Characters Or $): 1500
Expiration Date (Month Year): 10/31/2025
Include Cannula Information In Note?: Yes
Filler: Restylane Contour
Map Statment: See Attach Map for Complete Details
Cheeks Filler Volume In Cc: 1
Include Cannula Size?: 25G
Filler: Restylane-L

## 2024-12-05 ENCOUNTER — APPOINTMENT (RX ONLY)
Dept: URBAN - METROPOLITAN AREA CLINIC 173 | Facility: CLINIC | Age: 44
Setting detail: DERMATOLOGY
End: 2024-12-05

## 2024-12-05 DIAGNOSIS — Z41.9 ENCOUNTER FOR PROCEDURE FOR PURPOSES OTHER THAN REMEDYING HEALTH STATE, UNSPECIFIED: ICD-10-CM

## 2024-12-05 PROCEDURE — ? FILLERS

## 2024-12-05 PROCEDURE — ? DYSPORT

## 2024-12-05 NOTE — PROCEDURE: DYSPORT
Show Right And Left Pupillary Line Units: No
Dilution (U/0.1 Cc): 10
Show Forehead Units: Yes
Masseter Units: 0
Expiration Date (Month Year): 05/31/2026
Price (Use Numbers Only, No Special Characters Or $): 069
Consent: Written consent obtained. Risks include but not limited to lid/brow ptosis, bruising, swelling, diplopia, temporary effect, incomplete chemical denervation.
Post-Care Instructions: Patient instructed to not lie down for 4 hours and limit physical activity for 24 hours.
Additional Area 1 Units: 152
Additional Area 1 Location: Face
Detail Level: Zone
Lot #: [id number]

## 2024-12-05 NOTE — PROCEDURE: FILLERS
Include Cannula Length?: 1.5 inch
Additional Area 1 Volume In Cc: 1
Detail Level: Detailed
Nasolabial Folds Filler Volume In Cc: 0
Include Cannula Information In Note?: No
Price (Use Numbers Only, No Special Characters Or $): 767
Filler: Kev Bernabe
Consent: Risks reviewed including but not limited to bruising, irregular texture, incomplete augmentation and procedural pain. Written consent obtained.
Map Statment: See Attach Map for Complete Details
Lot #: [id number]
Lot #: [id number]
Expiration Date (Month Year): 6/25/25
Expiration Date (Month Year): 01/31/2027
Include Cannula Information In Note?: Yes
Anesthesia Volume In Cc: 0.5
Include Cannula Size?: 25G
Additional Area 1 Location: face
Topical Anesthesia?: 30% lidocaine, plasticized base
Additional Area 1 Location: cheeks + L tear trough
Additional Anesthesia Volume In Cc: 6

## 2024-12-17 ENCOUNTER — RX ONLY (RX ONLY)
Age: 44
End: 2024-12-17

## 2024-12-17 RX ORDER — PHARMACY COMPOUNDING ACCESSORY
EACH MISCELLANEOUS
Qty: 400 | Refills: 2 | Status: ERX

## 2024-12-20 ENCOUNTER — APPOINTMENT (OUTPATIENT)
Dept: URBAN - METROPOLITAN AREA CLINIC 173 | Facility: CLINIC | Age: 44
Setting detail: DERMATOLOGY
End: 2024-12-20

## 2024-12-20 DIAGNOSIS — Z41.9 ENCOUNTER FOR PROCEDURE FOR PURPOSES OTHER THAN REMEDYING HEALTH STATE, UNSPECIFIED: ICD-10-CM

## 2024-12-20 PROCEDURE — ? FRAXEL

## 2024-12-20 ASSESSMENT — LOCATION ZONE DERM: LOCATION ZONE: FACE

## 2024-12-20 ASSESSMENT — LOCATION SIMPLE DESCRIPTION DERM: LOCATION SIMPLE: RIGHT CHEEK

## 2024-12-20 ASSESSMENT — LOCATION DETAILED DESCRIPTION DERM: LOCATION DETAILED: RIGHT SUPERIOR MEDIAL BUCCAL CHEEK

## 2024-12-20 NOTE — PROCEDURE: FRAXEL
Small Plastic Eye Shield Text: The ocular mucosa was anesthetized with tetracaine. Once adequate anesthesia was optained, small plastic eye shields were inserted and remained in place until the procedure was completed.
Tip: 15mm
Consent: Written consent obtained, risks reviewed including but not limited to pain and incomplete improvement.
Treatment Level: 1
Location: neck
Number Of Passes: 8
Detail Level: Zone
Was An Eye Shield Used?: No
Treatment Level: 7
Total Coverage: 17%
Length Of Topical Anesthesia Application (Optional): 60 minutes
Number Of Passes: 4
Wavelength: 1550nm
Energy(Mj/Cm2): 20
Medium Plastic Eye Shield Text: The ocular mucosa was anesthetized with tetracaine. Once adequate anesthesia was optained, medium plastic eye shields were inserted and remained in place until the procedure was completed.
Location: forehead
Total Coverage: 20%
Small Metal Eye Shield Text: The ocular mucosa was anesthetized with tetracaine. Once adequate anesthesia was optained, small metal eye shields were inserted and remained in place until the procedure was completed.
Total Coverage: 35%
Indication: resurfacing
Energy(Mj/Cm2): 30
Anesthesia Volume In Cc: 0
Treatment Level: 6
Large Plastic Eye Shield Text: The ocular mucosa was anesthetized with tetracaine. Once adequate anesthesia was optained, large plastic eye shields were inserted and remained in place until the procedure was completed.
External Cooling: Alexandro Cryo 6
Medium Metal Eye Shield Text: The ocular mucosa was anesthetized with tetracaine. Once adequate anesthesia was optained, medium metal eye shields were inserted and remained in place until the procedure was completed.
Total Coverage: 50%
Location: full face
Post-Care Instructions: I reviewed with the patient in detail post-care instructions. Patient should avoid sun until area fully healed.
Location: Use Location Override
Energy(Mj/Cm2): 70
Large Metal Eye Shield Text: The ocular mucosa was anesthetized with tetracaine. Once adequate anesthesia was optained, large metal eye shields were inserted and remained in place until the procedure was completed.
Topical Anesthesia Type: 30% lidocaine
Price (Use Numbers Only, No Special Characters Or $): 719

## 2025-02-04 ENCOUNTER — APPOINTMENT (OUTPATIENT)
Dept: URBAN - METROPOLITAN AREA CLINIC 167 | Facility: CLINIC | Age: 45
Setting detail: DERMATOLOGY
End: 2025-02-04

## 2025-02-04 DIAGNOSIS — L82.0 INFLAMED SEBORRHEIC KERATOSIS: ICD-10-CM

## 2025-02-04 DIAGNOSIS — D17 BENIGN LIPOMATOUS NEOPLASM: ICD-10-CM

## 2025-02-04 PROBLEM — D17.1 BENIGN LIPOMATOUS NEOPLASM OF SKIN AND SUBCUTANEOUS TISSUE OF TRUNK: Status: ACTIVE | Noted: 2025-02-04

## 2025-02-04 PROCEDURE — 17110 DESTRUCTION B9 LES UP TO 14: CPT

## 2025-02-04 PROCEDURE — ? BENIGN DESTRUCTION

## 2025-02-04 PROCEDURE — 99212 OFFICE O/P EST SF 10 MIN: CPT | Mod: 25

## 2025-02-04 PROCEDURE — ? COUNSELING

## 2025-02-04 ASSESSMENT — LOCATION DETAILED DESCRIPTION DERM
LOCATION DETAILED: LEFT INFERIOR LATERAL UPPER BACK
LOCATION DETAILED: LEFT ANTERIOR SHOULDER

## 2025-02-04 ASSESSMENT — LOCATION SIMPLE DESCRIPTION DERM
LOCATION SIMPLE: LEFT SHOULDER
LOCATION SIMPLE: LEFT UPPER BACK

## 2025-02-04 ASSESSMENT — LOCATION ZONE DERM
LOCATION ZONE: ARM
LOCATION ZONE: TRUNK

## 2025-02-04 NOTE — PROCEDURE: BENIGN DESTRUCTION
Post-Care Instructions: I reviewed with the patient in detail post-care instructions. Patient is to wear sunprotection, and avoid picking at any of the treated lesions. Pt may apply Vaseline to crusted or scabbing areas.
Render Post-Care Instructions In Note?: no
Anesthesia Volume In Cc: 0
Medical Necessity Clause: This procedure was medically necessary because the lesions that were treated were: irritated
Include Z78.9 (Other Specified Conditions Influencing Health Status) As An Associated Diagnosis?: Yes
Detail Level: Simple
Consent: The patient's consent was obtained including but not limited to risks of crusting, scabbing, blistering, scarring, darker or lighter pigmentary change, recurrence, incomplete removal and infection.
Medical Necessity Information: It is in your best interest to select a reason for this procedure from the list below. All of these items fulfill various CMS LCD requirements except the new and changing color options.

## 2025-02-10 ENCOUNTER — APPOINTMENT (OUTPATIENT)
Dept: URBAN - METROPOLITAN AREA CLINIC 173 | Facility: CLINIC | Age: 45
Setting detail: DERMATOLOGY
End: 2025-02-10

## 2025-02-10 DIAGNOSIS — Z41.9 ENCOUNTER FOR PROCEDURE FOR PURPOSES OTHER THAN REMEDYING HEALTH STATE, UNSPECIFIED: ICD-10-CM

## 2025-02-10 PROCEDURE — ? DYSPORT

## 2025-02-10 NOTE — PROCEDURE: DYSPORT
Show Right And Left Pupillary Line Units: No
Dilution (U/0.1 Cc): 10
Show Forehead Units: Yes
Masseter Units: 0
Expiration Date (Month Year): 05/31/2026
Price (Use Numbers Only, No Special Characters Or $): 067
Consent: Written consent obtained. Risks include but not limited to lid/brow ptosis, bruising, swelling, diplopia, temporary effect, incomplete chemical denervation.
Post-Care Instructions: Patient instructed to not lie down for 4 hours and limit physical activity for 24 hours.
Additional Area 1 Units: 152
Additional Area 1 Location: Face
Detail Level: Zone
Lot #: [id number]

## 2025-06-09 ENCOUNTER — APPOINTMENT (OUTPATIENT)
Dept: URBAN - METROPOLITAN AREA CLINIC 173 | Facility: CLINIC | Age: 45
Setting detail: DERMATOLOGY
End: 2025-06-09

## 2025-06-09 DIAGNOSIS — Z41.9 ENCOUNTER FOR PROCEDURE FOR PURPOSES OTHER THAN REMEDYING HEALTH STATE, UNSPECIFIED: ICD-10-CM

## 2025-06-09 PROCEDURE — ? DYSPORT

## 2025-06-09 NOTE — PROCEDURE: DYSPORT
Show Right And Left Pupillary Line Units: No
Dilution (U/0.1 Cc): 10
Show Forehead Units: Yes
Masseter Units: 0
Expiration Date (Month Year): 12/31/2026
Price (Use Numbers Only, No Special Characters Or $): 028
Consent: Written consent obtained. Risks include but not limited to lid/brow ptosis, bruising, swelling, diplopia, temporary effect, incomplete chemical denervation.
Post-Care Instructions: Patient instructed to not lie down for 4 hours and limit physical activity for 24 hours.
Additional Area 1 Units: 152
Additional Area 1 Location: Face
Detail Level: Zone
Lot #: [id number]